# Patient Record
Sex: FEMALE | URBAN - METROPOLITAN AREA
[De-identification: names, ages, dates, MRNs, and addresses within clinical notes are randomized per-mention and may not be internally consistent; named-entity substitution may affect disease eponyms.]

---

## 2018-11-12 ENCOUNTER — OFFICE VISIT (OUTPATIENT)
Dept: URBAN - METROPOLITAN AREA CLINIC 33 | Facility: CLINIC | Age: 68
End: 2018-11-12
Payer: COMMERCIAL

## 2018-11-12 PROCEDURE — 99214 OFFICE O/P EST MOD 30 MIN: CPT | Performed by: OPHTHALMOLOGY

## 2018-11-12 PROCEDURE — 92133 CPTRZD OPH DX IMG PST SGM ON: CPT | Performed by: OPHTHALMOLOGY

## 2018-11-12 ASSESSMENT — INTRAOCULAR PRESSURE
OS: 12
OD: 14

## 2018-11-12 NOTE — IMPRESSION/PLAN
Impression: Open angle with borderline findings, low risk, bilateral: H40.013. /552, 9/17 HVF 04/18 full field OU: Full, 11/18 OCT 80/80 7/8
(+) fam hx of glc Plan: Discussed risk of vision loss with glaucoma and need for close f/u. IOP reasonable for Arias Aguirre 73 appearance OU. Will follow off drops for now. OCT ordered and discussed with patient. 6 month IOP and HVF with Dr Raudel Bhagat.

## 2019-04-29 ENCOUNTER — OFFICE VISIT (OUTPATIENT)
Dept: URBAN - METROPOLITAN AREA CLINIC 33 | Facility: CLINIC | Age: 69
End: 2019-04-29
Payer: MEDICARE

## 2019-04-29 DIAGNOSIS — H04.123 DRY EYE SYNDROME OF BILATERAL LACRIMAL GLANDS: ICD-10-CM

## 2019-04-29 DIAGNOSIS — H40.023 OPEN ANGLE WITH BORDERLINE FINDINGS, HIGH RISK, BILATERAL: ICD-10-CM

## 2019-04-29 PROCEDURE — 92012 INTRM OPH EXAM EST PATIENT: CPT | Performed by: OPTOMETRIST

## 2019-04-29 PROCEDURE — 92083 EXTENDED VISUAL FIELD XM: CPT | Performed by: OPTOMETRIST

## 2019-04-29 PROCEDURE — 92002 INTRM OPH EXAM NEW PATIENT: CPT | Performed by: OPTOMETRIST

## 2019-04-29 ASSESSMENT — INTRAOCULAR PRESSURE
OS: 14
OD: 14

## 2019-04-29 NOTE — IMPRESSION/PLAN
Impression: Open angle with borderline findings, low risk, bilateral: H40.013. /552, 9/17 HVF 04/18 full field OU: Full, 11/18 OCT 80/80 7/8
(+) fam hx of glc Plan: Discussed risk of vision loss with glaucoma and need for close f/u. IOP reasonable for Arias Moniquejosezairachloé 73 appearance OU. Will follow off drops for now. 
orig IOP 16/15, OCT 80/80(85/81)(84/83), VF OD NL OS borderline

## 2019-10-28 ENCOUNTER — OFFICE VISIT (OUTPATIENT)
Dept: URBAN - METROPOLITAN AREA CLINIC 33 | Facility: CLINIC | Age: 69
End: 2019-10-28
Payer: MEDICARE

## 2019-10-28 PROCEDURE — 99214 OFFICE O/P EST MOD 30 MIN: CPT | Performed by: OPTOMETRIST

## 2019-10-28 PROCEDURE — 92133 CPTRZD OPH DX IMG PST SGM ON: CPT | Performed by: OPTOMETRIST

## 2019-10-28 ASSESSMENT — INTRAOCULAR PRESSURE
OD: 14
OS: 14

## 2019-10-28 NOTE — IMPRESSION/PLAN
Impression: Open angle with borderline findings, low risk, bilateral: H40.013. /552, 9/17 HVF 04/18 full field OU: Full, 11/18 OCT 80/80 7/8
(+) fam hx of glc Plan: Discussed risk of vision loss with glaucoma and need for close f/u. IOP reasonable for Arias Moniquekennedyrodrigochloé 73 appearance OU. Will follow off drops for now. 
orig IOP 16/15, OCT 85/81(80/80)(85/81)(84/83), VF OD NL OS borderline

## 2020-11-12 ENCOUNTER — OFFICE VISIT (OUTPATIENT)
Dept: URBAN - METROPOLITAN AREA CLINIC 33 | Facility: CLINIC | Age: 70
End: 2020-11-12
Payer: MEDICARE

## 2020-11-12 DIAGNOSIS — H40.013 OPEN ANGLE WITH BORDERLINE FINDINGS, LOW RISK, BILATERAL: Primary | ICD-10-CM

## 2020-11-12 DIAGNOSIS — H43.813 VITREOUS DEGENERATION, BILATERAL: ICD-10-CM

## 2020-11-12 PROCEDURE — 92133 CPTRZD OPH DX IMG PST SGM ON: CPT | Performed by: OPTOMETRIST

## 2020-11-12 PROCEDURE — 99214 OFFICE O/P EST MOD 30 MIN: CPT | Performed by: OPTOMETRIST

## 2020-11-12 ASSESSMENT — VISUAL ACUITY
OS: 20/30
OD: 20/100

## 2020-11-12 ASSESSMENT — INTRAOCULAR PRESSURE
OS: 13
OD: 13

## 2020-11-12 ASSESSMENT — KERATOMETRY
OD: 44.63
OS: 46.38

## 2020-11-12 NOTE — IMPRESSION/PLAN
Impression: Open angle with borderline findings, low risk, bilateral: H40.013. Plan: IOPs 13/13 without medication. POAG suspect due optic nerve asymmetry. ONH-OCT ordered and reviewed showing (-)gross defects. No treatment required at this time. Continue to monitor condition.

## 2020-11-12 NOTE — IMPRESSION/PLAN
Impression: Age-related nuclear cataract, bilateral: H25.13. Plan: Cataracts account for the patient's complaints. Discussed all risks, benefits, procedures and recovery. Patient understands changing glasses will not improve vision. Patient desires to have surgery, recommend phacoemulsification with intraocular lens. Recommend CE OD, Standard IOL, aim plano Discussed need for glasses after surgery. If desired, patient is a candidate for Toric IOL and PanOptix if approved by surgeon, LASIK flap dissects pupil OD.

## 2020-11-24 ENCOUNTER — TESTING ONLY (OUTPATIENT)
Dept: URBAN - METROPOLITAN AREA CLINIC 33 | Facility: CLINIC | Age: 70
End: 2020-11-24
Payer: MEDICARE

## 2020-11-24 PROCEDURE — 92025 CPTRIZED CORNEAL TOPOGRAPHY: CPT | Performed by: OPHTHALMOLOGY

## 2020-11-24 PROCEDURE — 76519 ECHO EXAM OF EYE: CPT | Performed by: OPHTHALMOLOGY

## 2020-11-24 ASSESSMENT — PACHYMETRY
OS: 23.01
OD: 23.11
OD: 3.40
OS: 3.22

## 2020-12-01 ENCOUNTER — OFFICE VISIT (OUTPATIENT)
Dept: URBAN - METROPOLITAN AREA CLINIC 33 | Facility: CLINIC | Age: 70
End: 2020-12-01
Payer: MEDICARE

## 2020-12-01 DIAGNOSIS — H25.13 AGE-RELATED NUCLEAR CATARACT, BILATERAL: ICD-10-CM

## 2020-12-01 DIAGNOSIS — Z98.890 PERSONAL HX OF SURGERY: ICD-10-CM

## 2020-12-01 PROCEDURE — 99214 OFFICE O/P EST MOD 30 MIN: CPT | Performed by: OPHTHALMOLOGY

## 2020-12-01 PROCEDURE — 92134 CPTRZ OPH DX IMG PST SGM RTA: CPT | Performed by: OPHTHALMOLOGY

## 2020-12-01 RX ORDER — DUREZOL 0.5 MG/ML
0.05 % EMULSION OPHTHALMIC
Qty: 5 | Refills: 2 | Status: INACTIVE
Start: 2020-12-01 | End: 2020-12-03

## 2020-12-01 RX ORDER — MOXIFLOXACIN HYDROCHLORIDE 5 MG/ML
0.5 % SOLUTION/ DROPS OPHTHALMIC
Qty: 10 | Refills: 1 | Status: INACTIVE
Start: 2020-12-01 | End: 2020-12-03

## 2020-12-01 ASSESSMENT — INTRAOCULAR PRESSURE
OS: 13
OD: 13

## 2020-12-01 NOTE — IMPRESSION/PLAN
Impression: Personal hx of surgery: Z98.890. Plan: Past HYPEROPIC LASIK OU Discussed 50/50 Chance of needing additional surgery due to previous lasik and may affect outcome of surgery.

## 2020-12-01 NOTE — IMPRESSION/PLAN
Impression: Age-related nuclear cataract, bilateral: H25.13. Plan: Patients cataract are visually significant and affecting patients daily activities. Okay to proceed with cataract surgery. Discussed risks, benefits and alternatives to surgery including but not limited to: bleeding, infection, risk of vision loss, loss of the eye, need for other surgery. Patient voiced understanding and wishes to proceed. If Durezol/Vigamox not covered by insurance, okay to switch to generic. RIGHT EYE THEN LEFT EYE
RL2
LENS: STD Lens  SN60WF 23.00 OD   * Discussed TORIC lens but due to financials declined. AIM: MONO VA OD NEAR -2.00/ OS DISTANCE Hx of Hyperopic LASIK (mono vision) YOUNG:1.14D/ 1.45D Recommend ORA OU Pt understands will need glasses for BCVA after Sx.

## 2020-12-01 NOTE — IMPRESSION/PLAN
Impression: Open angle with borderline findings, low risk, bilateral: H40.013. Plan:  POAG suspect due optic nerve asymmetry. ONH-OCT ordered and reviewed showing (-)gross defects. No treatment required at this time. Continue to monitor condition.

## 2020-12-08 ENCOUNTER — SURGERY (OUTPATIENT)
Dept: URBAN - METROPOLITAN AREA SURGERY 15 | Facility: SURGERY | Age: 70
End: 2020-12-08
Payer: MEDICARE

## 2020-12-08 PROCEDURE — 66984 XCAPSL CTRC RMVL W/O ECP: CPT | Performed by: OPHTHALMOLOGY

## 2020-12-09 ENCOUNTER — POST-OPERATIVE VISIT (OUTPATIENT)
Dept: URBAN - METROPOLITAN AREA CLINIC 33 | Facility: CLINIC | Age: 70
End: 2020-12-09

## 2020-12-09 PROCEDURE — 99024 POSTOP FOLLOW-UP VISIT: CPT | Performed by: OPTOMETRIST

## 2020-12-09 ASSESSMENT — INTRAOCULAR PRESSURE
OS: 12
OD: 12

## 2020-12-09 NOTE — IMPRESSION/PLAN
Impression: S/P CE/IOL Standard SN60WF 23.00 ORA Near OD - 1 Day. Encounter for surgical aftercare following surgery on a sense organ  Z48.810. Plan: Recommend patient starts uses artificial tears for comfort. Dilate OD at next appointment.

## 2020-12-16 ENCOUNTER — POST-OPERATIVE VISIT (OUTPATIENT)
Dept: URBAN - METROPOLITAN AREA CLINIC 33 | Facility: CLINIC | Age: 70
End: 2020-12-16

## 2020-12-16 PROCEDURE — 99024 POSTOP FOLLOW-UP VISIT: CPT | Performed by: OPTOMETRIST

## 2020-12-16 ASSESSMENT — VISUAL ACUITY
OD: 20/25-2
OS: 20/25-3

## 2020-12-16 ASSESSMENT — INTRAOCULAR PRESSURE
OS: 12
OD: 12

## 2020-12-16 NOTE — IMPRESSION/PLAN
Impression: S/P CE/IOL Standard SN60WF 23.00 ORA Near OD - 8 Days. Encounter for surgical aftercare following surgery on a sense organ  Z48.810. Plan: Right eye corrects for near vision. See Dr. Derick Bruno in 4 weeks to discus options for CE OS.

## 2021-02-02 ENCOUNTER — POST-OPERATIVE VISIT (OUTPATIENT)
Dept: URBAN - METROPOLITAN AREA CLINIC 33 | Facility: CLINIC | Age: 71
End: 2021-02-02

## 2021-02-02 PROCEDURE — 99024 POSTOP FOLLOW-UP VISIT: CPT | Performed by: OPHTHALMOLOGY

## 2021-02-02 ASSESSMENT — INTRAOCULAR PRESSURE
OS: 12
OD: 12

## 2021-02-02 ASSESSMENT — VISUAL ACUITY
OS: 20/25
OD: 20/40

## 2021-02-23 ENCOUNTER — POST-OPERATIVE VISIT (OUTPATIENT)
Dept: URBAN - METROPOLITAN AREA CLINIC 33 | Facility: CLINIC | Age: 71
End: 2021-02-23

## 2021-02-23 DIAGNOSIS — Z48.810 ENCOUNTER FOR SURGICAL AFTERCARE FOLLOWING SURGERY ON A SENSE ORGAN: Primary | ICD-10-CM

## 2021-02-23 PROCEDURE — 99024 POSTOP FOLLOW-UP VISIT: CPT | Performed by: OPHTHALMOLOGY

## 2021-02-23 ASSESSMENT — INTRAOCULAR PRESSURE
OS: 11
OD: 12

## 2021-02-23 ASSESSMENT — VISUAL ACUITY
OS: 20/25-1
OD: 20/30

## 2021-02-23 NOTE — IMPRESSION/PLAN
Impression: S/P CE/IOL Standard SN60WF 23.00 ORA Near OD - 77 Days. Encounter for surgical aftercare following surgery on a sense organ  Z48.810. Plan: Continue aggressive lubrication and zaditor. Discussed she may cat sx in OS when desires.  Recheck in 3 months

## 2021-05-25 ENCOUNTER — OFFICE VISIT (OUTPATIENT)
Dept: URBAN - METROPOLITAN AREA CLINIC 33 | Facility: CLINIC | Age: 71
End: 2021-05-25
Payer: MEDICARE

## 2021-05-25 DIAGNOSIS — H25.12 AGE-RELATED NUCLEAR CATARACT, LEFT EYE: ICD-10-CM

## 2021-05-25 PROCEDURE — 99214 OFFICE O/P EST MOD 30 MIN: CPT | Performed by: OPHTHALMOLOGY

## 2021-05-25 ASSESSMENT — KERATOMETRY
OD: 45.13
OS: 46.63

## 2021-05-25 ASSESSMENT — VISUAL ACUITY
OD: 20/25
OS: 20/20

## 2021-05-25 ASSESSMENT — INTRAOCULAR PRESSURE
OS: 11
OD: 8

## 2021-05-25 NOTE — IMPRESSION/PLAN
Impression: Age-related nuclear cataract, left eye: H25.12. Plan: Cataracts account for the patient's complaints. No treatment currently recommended. The patient will monitor vision changes and contact us with any decrease in vision. Will continue to monitor.

## 2021-05-25 NOTE — IMPRESSION/PLAN
Impression: S/P CE/IOL Standard SN60WF 23.00 ORA Near OD - 77 Days. Encounter for surgical aftercare following surgery on a sense organ  Z48.810. Plan: Continue aggressive lubrication and zaditor. Discussed she may need cat sx in OS when desires.

## 2023-02-15 ENCOUNTER — OFFICE VISIT (OUTPATIENT)
Dept: URBAN - METROPOLITAN AREA CLINIC 33 | Facility: CLINIC | Age: 73
End: 2023-02-15
Payer: MEDICARE

## 2023-02-15 DIAGNOSIS — Z96.1 PRESENCE OF INTRAOCULAR LENS: ICD-10-CM

## 2023-02-15 DIAGNOSIS — H43.813 VITREOUS DEGENERATION, BILATERAL: ICD-10-CM

## 2023-02-15 DIAGNOSIS — H04.123 DRY EYE SYNDROME OF BILATERAL LACRIMAL GLANDS: ICD-10-CM

## 2023-02-15 DIAGNOSIS — H40.013 OPEN ANGLE WITH BORDERLINE FINDINGS, LOW RISK, BILATERAL: ICD-10-CM

## 2023-02-15 DIAGNOSIS — H25.12 AGE-RELATED NUCLEAR CATARACT, LEFT EYE: Primary | ICD-10-CM

## 2023-02-15 PROCEDURE — 99214 OFFICE O/P EST MOD 30 MIN: CPT

## 2023-02-15 ASSESSMENT — VISUAL ACUITY
OD: 20/25
OS: 20/20-

## 2023-02-15 ASSESSMENT — INTRAOCULAR PRESSURE
OS: 12
OD: 11

## 2023-02-15 NOTE — IMPRESSION/PLAN
Impression: Open angle with borderline findings, low risk, bilateral: H40.013.
-RNFL OCT ordered in 2019 showing no thin quadrants
-pressures today of 11/12 Plan: Patient educated on ocular findings. Condition and IOP stable without medication. Careful observation was discussed and is strongly recommended to prevent possible future vision loss. Will continue to monitor intraocular pressure and testing in clinic at regular intervals. No treatment is being implemented at this time.

## 2023-02-15 NOTE — IMPRESSION/PLAN
Impression: Age-related nuclear cataract, left eye: H25.12. Plan: Patient educated on ocular findings. Visual functioning is good, and cataract surgery is not required at this time. Further advised there is no specific urgency for cataract surgery. They were also advised that having cataract surgery does not mean they will not need to use glasses or contact lenses. We will continue to monitor and they are advised to call or return if any new symptoms.

## 2025-07-31 ENCOUNTER — OFFICE VISIT (OUTPATIENT)
Dept: URBAN - METROPOLITAN AREA CLINIC 33 | Facility: CLINIC | Age: 75
End: 2025-07-31
Payer: MEDICARE

## 2025-07-31 DIAGNOSIS — H43.813 VITREOUS DEGENERATION, BILATERAL: ICD-10-CM

## 2025-07-31 DIAGNOSIS — H25.12 AGE-RELATED NUCLEAR CATARACT, LEFT EYE: ICD-10-CM

## 2025-07-31 DIAGNOSIS — H40.013 OPEN ANGLE WITH BORDERLINE FINDINGS, LOW RISK, BILATERAL: Primary | ICD-10-CM

## 2025-07-31 PROCEDURE — 92133 CPTRZD OPH DX IMG PST SGM ON: CPT

## 2025-07-31 PROCEDURE — 99213 OFFICE O/P EST LOW 20 MIN: CPT

## 2025-07-31 ASSESSMENT — INTRAOCULAR PRESSURE
OD: 11
OS: 12